# Patient Record
Sex: FEMALE | Race: BLACK OR AFRICAN AMERICAN | NOT HISPANIC OR LATINO | Employment: UNEMPLOYED | URBAN - METROPOLITAN AREA
[De-identification: names, ages, dates, MRNs, and addresses within clinical notes are randomized per-mention and may not be internally consistent; named-entity substitution may affect disease eponyms.]

---

## 2023-10-30 ENCOUNTER — HOSPITAL ENCOUNTER (EMERGENCY)
Facility: HOSPITAL | Age: 23
Discharge: HOME | End: 2023-10-30

## 2023-10-30 VITALS
TEMPERATURE: 98.3 F | RESPIRATION RATE: 18 BRPM | WEIGHT: 187 LBS | OXYGEN SATURATION: 98 % | HEIGHT: 64 IN | BODY MASS INDEX: 31.92 KG/M2 | DIASTOLIC BLOOD PRESSURE: 64 MMHG | SYSTOLIC BLOOD PRESSURE: 126 MMHG | HEART RATE: 88 BPM

## 2023-10-30 DIAGNOSIS — J02.9 ACUTE PHARYNGITIS, UNSPECIFIED ETIOLOGY: Primary | ICD-10-CM

## 2023-10-30 PROCEDURE — 99284 EMERGENCY DEPT VISIT MOD MDM: CPT | Performed by: NURSE PRACTITIONER

## 2023-10-30 PROCEDURE — 2500000004 HC RX 250 GENERAL PHARMACY W/ HCPCS (ALT 636 FOR OP/ED): Performed by: NURSE PRACTITIONER

## 2023-10-30 PROCEDURE — 99283 EMERGENCY DEPT VISIT LOW MDM: CPT

## 2023-10-30 RX ORDER — ACETAMINOPHEN 325 MG/1
650 TABLET ORAL ONCE
Status: COMPLETED | OUTPATIENT
Start: 2023-10-30 | End: 2023-10-30

## 2023-10-30 RX ORDER — AMOXICILLIN 500 MG/1
500 CAPSULE ORAL EVERY 12 HOURS
Qty: 20 CAPSULE | Refills: 0 | Status: SHIPPED | OUTPATIENT
Start: 2023-10-30 | End: 2023-11-09

## 2023-10-30 RX ORDER — IBUPROFEN 600 MG/1
600 TABLET ORAL EVERY 6 HOURS PRN
Qty: 15 TABLET | Refills: 0 | Status: SHIPPED | OUTPATIENT
Start: 2023-10-30 | End: 2023-11-06

## 2023-10-30 RX ADMIN — ACETAMINOPHEN 650 MG: 325 TABLET ORAL at 21:24

## 2023-10-30 RX ADMIN — DEXAMETHASONE 8 MG: 6 TABLET ORAL at 21:25

## 2023-10-30 ASSESSMENT — COLUMBIA-SUICIDE SEVERITY RATING SCALE - C-SSRS
1. IN THE PAST MONTH, HAVE YOU WISHED YOU WERE DEAD OR WISHED YOU COULD GO TO SLEEP AND NOT WAKE UP?: NO
6. HAVE YOU EVER DONE ANYTHING, STARTED TO DO ANYTHING, OR PREPARED TO DO ANYTHING TO END YOUR LIFE?: NO
2. HAVE YOU ACTUALLY HAD ANY THOUGHTS OF KILLING YOURSELF?: NO

## 2023-10-30 ASSESSMENT — PAIN - FUNCTIONAL ASSESSMENT: PAIN_FUNCTIONAL_ASSESSMENT: 0-10

## 2023-10-30 ASSESSMENT — PAIN SCALES - GENERAL
PAINLEVEL_OUTOF10: 6
PAINLEVEL_OUTOF10: 5 - MODERATE PAIN

## 2023-10-30 ASSESSMENT — PAIN SCALES - PAIN ASSESSMENT IN ADVANCED DEMENTIA (PAINAD)
BODYLANGUAGE: RELAXED
BREATHING: NORMAL

## 2023-10-30 ASSESSMENT — ENCOUNTER SYMPTOMS: SORE THROAT: 1

## 2023-10-31 NOTE — ED PROVIDER NOTES
"Emergency Department Encounter  [facility] EMERGENCY MEDICINE    Patient: Shruti Barrientos  MRN: [id number]  : 2000  Date of Evaluation: 10/30/2023  ED Provider: PUNEET James      Chief Complaint       Chief Complaint   Patient presents with    Sore Throat    Mouth Lesions        Limitations to History: none  Historian: patient  Records reviewed: EMR inpatient and outpatient notes, Care Everywhere    This is a 23-year-old female who presents to the emergency room stating \"my throat is on fire.\"  Patient states she developed symptoms today.  Patient has a productive cough with green sputum, sore throat and chills. Patient reports she developed midsternal chest pain today. Denies any known sick contacts.  Patient describes the throat pain as a burning, constant pain rating it a 6 out of 10.  Denies taking any pain medications prior to arrival.  Patient states that she developed a herpes outbreak on her mouth.  Patient states that she used over-the-counter Abreva and Mederma as well as new make-up.  She reports that the skin came off where her herpes blisters were.  Patient endorses chest pain with the cough.    PMH: HSV, tonsil stones  PSH: Denies  Allergies: Latex  Social HX: Denies smoking, occasional alcohol use, denies any drug use.  Family HX: No family history pertinent to current presenting problem  Medications: Denies        ROS:     Review of Systems   HENT:  Positive for sore throat.    Cardiovascular:  Positive for chest pain.   Skin:  Positive for rash.     14 systems reviewed and otherwise acutely negative except as in the [place].    Physical Exam:    Appearance: Alert, oriented , cooperative,  in no acute distress. Well nourished & well hydrated.    Skin: Scaling to the upper lip without any surrounding erythema or drainage.    Eyes: PERRLA, EOMs intact,  Conjunctiva pink with no redness or exudates. Cornea & anterior chamber are clear, Eyelids without lesions.     ENT: " Hearing grossly intact. External auditory canals patent, tympanic membranes intact with visible landmarks. Nares patent, mucus membranes moist. Dentition without lesions. Pharynx clear, uvula midline.     Neck: Supple, without meningismus.     Pulmonary: Clear bilaterally with good chest wall excursion. No rales, rhonchi or wheezing. No accessory muscle use or stridor.    Cardiac: Normal S1, S2 without murmur, rub, gallop or extrasystole. No JVD, Carotids without bruits.    Abdomen: Soft, nontender, active bowel sounds.  No palpable organomegaly.  No rebound or guarding.        Musculoskeletal: Full range of motion. no pain, edema, or deformity. Pulses full and equal. No cyanosis, clubbing, or edema.    Neurological:  Cranial nerves II through XII are grossly intact, finger-nose touch is normal, normal sensation, no weakness, no focal findings identified.    Psychiatric: Appropriate mood and affect.       Past History     Past Medical History:   Diagnosis Date    Acute vaginitis 05/20/2021    Bacterial vaginosis    Elevated blood-pressure reading, without diagnosis of hypertension 02/09/2021    Elevated blood pressure reading without diagnosis of hypertension    Encounter for gynecological examination (general) (routine) without abnormal findings 05/11/2018    Well woman exam with routine gynecological exam    Encounter for immunization 03/30/2016    Immunization due    Encounter for initial prescription of contraceptive pills 05/31/2017    Encounter for initial prescription of contraceptive pills    Encounter for other general counseling and advice on contraception 02/10/2017    Encounter for counseling regarding contraception    Encounter for pregnancy test, result negative 07/01/2020    Negative pregnancy test    Encounter for pregnancy test, result positive 07/02/2020    Positive urine pregnancy test    Encounter for screening for infections with a predominantly sexual mode of transmission 07/01/2020    Routine  screening for STI (sexually transmitted infection)    Encounter for screening for respiratory tuberculosis 12/20/2019    Encounter for PPD test    Encounter for supervision of normal first pregnancy, unspecified trimester     Encounter for prenatal care of first pregnancy    Encounter for surveillance of contraceptive pills 05/11/2018    Encounter for surveillance of contraceptive pills    Irregular menstruation, unspecified 05/11/2018    Missed period    Irregular menstruation, unspecified 07/02/2020    Missed menses    Laceration without foreign body of vagina and vulva, initial encounter 06/29/2021    Labial tear    Mastodynia 01/15/2020    Breast tenderness in female    Other problems related to lifestyle 07/01/2020    Other problems related to lifestyle    Other specified disorders of nose and nasal sinuses 05/01/2019    Sinus pressure    Other specified health status     No pertinent past surgical history    Pelvic and perineal pain 01/15/2020    Pelvic cramping    Personal history of other complications of pregnancy, childbirth and the puerperium 02/09/2021    History of pre-eclampsia    Personal history of other diseases of the female genital tract 01/15/2020    History of irregular menstrual cycles    Personal history of other diseases of the female genital tract 05/08/2019    History of ovarian cyst    Personal history of other diseases of the female genital tract 07/30/2018    History of acute pelvic inflammatory disease    Personal history of other diseases of the respiratory system 05/19/2016    History of sinusitis    Personal history of other diseases of the respiratory system 05/19/2016    History of allergic rhinitis    Personal history of other specified conditions 07/30/2018    History of abdominal pain    Unspecified asthma, uncomplicated 11/22/2017    Asthma     Past Surgical History:   Procedure Laterality Date    OTHER SURGICAL HISTORY  03/03/2021    No history of surgery  "        Medications/Allergies     Previous Medications    No medications on file     Allergies   Allergen Reactions    Latex Hives        Physical Exam       ED Triage Vitals [10/30/23 2008]   Temp Heart Rate Resp BP   36.8 °C (98.3 °F) 88 16 (!) 146/96      SpO2 Temp Source Heart Rate Source Patient Position   98 % Temporal -- --      BP Location FiO2 (%)     -- --         Diagnostics   Labs:  No results found for this or any previous visit (from the past 24 hour(s)).   Radiographs:  No orders to display     EKG:  NSR HR 77  10/30/23 at 2148  Indication: CP        Assessment   In brief, Shruti Barrientos is a 23 y.o. female who presented to the emergency department with a sore throat.          ED Course   Visit Vitals  BP (!) 146/96   Pulse 88   Temp 36.8 °C (98.3 °F) (Temporal)   Resp 16   Ht 1.626 m (5' 4\")   Wt 84.8 kg (187 lb)   SpO2 98%   BMI 32.10 kg/m²   BSA 1.96 m²       Medications   dexAMETHasone (Decadron) tablet 8 mg (8 mg oral Given 10/30/23 2125)   acetaminophen (Tylenol) tablet 650 mg (650 mg oral Given 10/30/23 2124)       Patient remained stable while in the emergency department. Previous outpatient and ED records were reviewed. Outside records were reviewed.  Strep swab was obtained, pending results. Patient received Dexamethasone 8 mg PO once and Tylenol 650 mg PO once.  Patient was reporting chest pain and EKG was obtained which shows normal sinus rhythm at 77.  Chest pain likely due to the cough.  Vital signs were stable.  Patient was discharged home with a prescription for amoxicillin for acute pharyngitis and Tylenol as needed for pain.  Patient was advised to return the emergency room with worsening symptoms and follow-up with her primary care doctor.  Final Impression      1. Acute pharyngitis, unspecified etiology          DISPOSITION  Disposition: Discharged home    Comment: Please note this report has been produced using speech recognition software and may contain errors related to that " system including errors in grammar, punctuation, and spelling, as well as words and phrases that may be inappropriate.  If there are any questions or concerns please feel free to contact the dictating provider for clarification.    SANDRA James-PUNEET Gaines  10/30/23 4052

## 2023-10-31 NOTE — ED TRIAGE NOTES
Pt coming to ED with herpes virus outbreak in mouth starting Friday, small lesions on upper lip. Pt c/o sore throat starting today, denies any other complaints at this time.

## 2023-11-08 ENCOUNTER — HOSPITAL ENCOUNTER (EMERGENCY)
Facility: HOSPITAL | Age: 23
Discharge: HOME | End: 2023-11-08

## 2023-11-08 ENCOUNTER — APPOINTMENT (OUTPATIENT)
Dept: RADIOLOGY | Facility: HOSPITAL | Age: 23
End: 2023-11-08

## 2023-11-08 VITALS
OXYGEN SATURATION: 99 % | RESPIRATION RATE: 16 BRPM | DIASTOLIC BLOOD PRESSURE: 83 MMHG | TEMPERATURE: 97.9 F | SYSTOLIC BLOOD PRESSURE: 138 MMHG | HEART RATE: 82 BPM

## 2023-11-08 DIAGNOSIS — J06.9 VIRAL URI WITH COUGH: Primary | ICD-10-CM

## 2023-11-08 LAB
FLUAV RNA RESP QL NAA+PROBE: NOT DETECTED
FLUBV RNA RESP QL NAA+PROBE: NOT DETECTED
SARS-COV-2 RNA RESP QL NAA+PROBE: NOT DETECTED

## 2023-11-08 PROCEDURE — 99284 EMERGENCY DEPT VISIT MOD MDM: CPT | Performed by: NURSE PRACTITIONER

## 2023-11-08 PROCEDURE — 2500000001 HC RX 250 WO HCPCS SELF ADMINISTERED DRUGS (ALT 637 FOR MEDICARE OP): Mod: SE | Performed by: NURSE PRACTITIONER

## 2023-11-08 PROCEDURE — 99283 EMERGENCY DEPT VISIT LOW MDM: CPT | Mod: 25

## 2023-11-08 PROCEDURE — 71046 X-RAY EXAM CHEST 2 VIEWS: CPT | Performed by: RADIOLOGY

## 2023-11-08 PROCEDURE — 71046 X-RAY EXAM CHEST 2 VIEWS: CPT | Mod: FY

## 2023-11-08 PROCEDURE — 87636 SARSCOV2 & INF A&B AMP PRB: CPT | Performed by: EMERGENCY MEDICINE

## 2023-11-08 PROCEDURE — 99285 EMERGENCY DEPT VISIT HI MDM: CPT | Mod: 25

## 2023-11-08 RX ORDER — ALBUTEROL SULFATE 90 UG/1
2 AEROSOL, METERED RESPIRATORY (INHALATION) EVERY 4 HOURS PRN
Qty: 18 G | Refills: 0 | Status: SHIPPED | OUTPATIENT
Start: 2023-11-08 | End: 2023-12-08

## 2023-11-08 RX ORDER — PREDNISONE 20 MG/1
40 TABLET ORAL DAILY
Qty: 10 TABLET | Refills: 0 | Status: SHIPPED | OUTPATIENT
Start: 2023-11-08 | End: 2023-11-13

## 2023-11-08 RX ORDER — IBUPROFEN 600 MG/1
600 TABLET ORAL ONCE
Status: COMPLETED | OUTPATIENT
Start: 2023-11-08 | End: 2023-11-08

## 2023-11-08 RX ORDER — GUAIFENESIN 100 MG/5ML
200 SOLUTION ORAL ONCE
Status: COMPLETED | OUTPATIENT
Start: 2023-11-08 | End: 2023-11-08

## 2023-11-08 RX ORDER — BENZONATATE 100 MG/1
100 CAPSULE ORAL 3 TIMES DAILY PRN
Qty: 21 CAPSULE | Refills: 0 | Status: SHIPPED | OUTPATIENT
Start: 2023-11-08 | End: 2023-11-15

## 2023-11-08 RX ADMIN — GUAIFENESIN 200 MG: 100 SOLUTION ORAL at 12:15

## 2023-11-08 RX ADMIN — IBUPROFEN 600 MG: 600 TABLET ORAL at 12:15

## 2023-11-08 ASSESSMENT — COLUMBIA-SUICIDE SEVERITY RATING SCALE - C-SSRS
1. IN THE PAST MONTH, HAVE YOU WISHED YOU WERE DEAD OR WISHED YOU COULD GO TO SLEEP AND NOT WAKE UP?: NO
2. HAVE YOU ACTUALLY HAD ANY THOUGHTS OF KILLING YOURSELF?: NO
6. HAVE YOU EVER DONE ANYTHING, STARTED TO DO ANYTHING, OR PREPARED TO DO ANYTHING TO END YOUR LIFE?: NO

## 2023-11-08 ASSESSMENT — ENCOUNTER SYMPTOMS
WHEEZING: 1
SHORTNESS OF BREATH: 0
VOICE CHANGE: 0
NAUSEA: 0
COUGH: 1
TROUBLE SWALLOWING: 0
VOMITING: 0
FEVER: 0
DIARRHEA: 0
RHINORRHEA: 0
CHILLS: 0
SORE THROAT: 0

## 2023-11-08 NOTE — ED PROVIDER NOTES
HPI   Chief Complaint   Patient presents with    Sore Throat       HPI 23 year old female with hx of asthma presents to the Emergency Department today with complaints of productive cough x 1 week. Was evaluated in the ED approx 1 week ago for sore throat, reports having negative strep testing and was given Amox for pharyngitis. She reports that her sore throat has improved, however the cough has persisted. She does endorse occasional wheezing and chest tightness with cough. Does not have a home inhaler. Denies fever/chills, congestion, rhinorrhea, dyspnea, n/v, hoarseness. Tolerating PO without difficulty. Denies any known COVID exposure.      Review of Systems   Constitutional:  Negative for chills and fever.   HENT:  Negative for congestion, ear pain, rhinorrhea, sore throat, trouble swallowing and voice change.    Respiratory:  Positive for cough and wheezing. Negative for shortness of breath.    Cardiovascular:  Positive for chest pain.   Gastrointestinal:  Negative for diarrhea, nausea and vomiting.                    No data recorded                Patient History   Past Medical History:   Diagnosis Date    Acute vaginitis 05/20/2021    Bacterial vaginosis    Elevated blood-pressure reading, without diagnosis of hypertension 02/09/2021    Elevated blood pressure reading without diagnosis of hypertension    Encounter for gynecological examination (general) (routine) without abnormal findings 05/11/2018    Well woman exam with routine gynecological exam    Encounter for immunization 03/30/2016    Immunization due    Encounter for initial prescription of contraceptive pills 05/31/2017    Encounter for initial prescription of contraceptive pills    Encounter for other general counseling and advice on contraception 02/10/2017    Encounter for counseling regarding contraception    Encounter for pregnancy test, result negative 07/01/2020    Negative pregnancy test    Encounter for pregnancy test, result positive  07/02/2020    Positive urine pregnancy test    Encounter for screening for infections with a predominantly sexual mode of transmission 07/01/2020    Routine screening for STI (sexually transmitted infection)    Encounter for screening for respiratory tuberculosis 12/20/2019    Encounter for PPD test    Encounter for supervision of normal first pregnancy, unspecified trimester     Encounter for prenatal care of first pregnancy    Encounter for surveillance of contraceptive pills 05/11/2018    Encounter for surveillance of contraceptive pills    Irregular menstruation, unspecified 05/11/2018    Missed period    Irregular menstruation, unspecified 07/02/2020    Missed menses    Laceration without foreign body of vagina and vulva, initial encounter 06/29/2021    Labial tear    Mastodynia 01/15/2020    Breast tenderness in female    Other problems related to lifestyle 07/01/2020    Other problems related to lifestyle    Other specified disorders of nose and nasal sinuses 05/01/2019    Sinus pressure    Other specified health status     No pertinent past surgical history    Pelvic and perineal pain 01/15/2020    Pelvic cramping    Personal history of other complications of pregnancy, childbirth and the puerperium 02/09/2021    History of pre-eclampsia    Personal history of other diseases of the female genital tract 01/15/2020    History of irregular menstrual cycles    Personal history of other diseases of the female genital tract 05/08/2019    History of ovarian cyst    Personal history of other diseases of the female genital tract 07/30/2018    History of acute pelvic inflammatory disease    Personal history of other diseases of the respiratory system 05/19/2016    History of sinusitis    Personal history of other diseases of the respiratory system 05/19/2016    History of allergic rhinitis    Personal history of other specified conditions 07/30/2018    History of abdominal pain    Unspecified asthma, uncomplicated  11/22/2017    Asthma     Past Surgical History:   Procedure Laterality Date    OTHER SURGICAL HISTORY  03/03/2021    No history of surgery     No family history on file.  Social History     Tobacco Use    Smoking status: Not on file    Smokeless tobacco: Not on file   Substance Use Topics    Alcohol use: Not on file    Drug use: Not on file       Physical Exam   ED Triage Vitals [11/08/23 1124]   Temp Heart Rate Resp BP   36.6 °C (97.9 °F) 82 16 138/83      SpO2 Temp src Heart Rate Source Patient Position   99 % -- -- --      BP Location FiO2 (%)     -- --       Physical Exam  Vitals reviewed.   Constitutional:       Appearance: She is well-developed.   HENT:      Head: Normocephalic and atraumatic.      Right Ear: Tympanic membrane and ear canal normal.      Left Ear: Tympanic membrane and ear canal normal.      Mouth/Throat:      Mouth: Mucous membranes are moist. No oral lesions.      Pharynx: Uvula midline. No pharyngeal swelling, oropharyngeal exudate or posterior oropharyngeal erythema.      Tonsils: No tonsillar exudate. 1+ on the right. 1+ on the left.   Cardiovascular:      Rate and Rhythm: Normal rate and regular rhythm.   Pulmonary:      Effort: Pulmonary effort is normal. No respiratory distress.      Breath sounds: Normal breath sounds. No wheezing, rhonchi or rales.   Musculoskeletal:      Cervical back: Neck supple.   Lymphadenopathy:      Cervical: No cervical adenopathy.   Skin:     General: Skin is warm and dry.   Neurological:      Mental Status: She is alert.     XR chest 2 views    Result Date: 11/8/2023  Interpreted By:  Quoc Valle, STUDY: Chest dated  11/8/2023.   INDICATION: Signs/Symptoms:cough   COMPARISON: Chest dated 03/18/2015.   ACCESSION NUMBER(S): [id number]   ORDERING CLINICIAN: HORACIO ALVAREZ   TECHNIQUE: Two views of the chest.   FINDINGS: The lungs are clear.  No pneumothorax or effusion is evident. The cardiomediastinal silhouette is  not enlarged.The soft tissues are  grossly unremarkable.       No acute cardiopulmonary process is evident.   MACRO: None   Signed by: Quoc Valle 11/8/2023 1:11 PM Dictation workstation:   [id number]      ED Course & MDM   Diagnoses as of 11/08/23 1355   Viral URI with cough   23 year old female with hx of asthma presents to the ED with complaints of productive cough x 1 week. She does report associated chest tightness with cough and occasional wheezing. Denies fevers, congestion, rhinorrhea. Was evaluated in the ED last week, given Amoxicillin for pharyngitis with improvement of sore throat. (Negative strep at that time). She is well appearing, resting comfortably without distress. COVID and Influenza testing obtained and unremarkable. CXR did not reveal any acute findings. Results discussed with patient. She is stable for discharge home with rx for Prednisone 40mg daily x 5 days, Proair inhaler PRN for SOB/wheezing and Tessalon Perles TID PRN for cough. Suspect likely viral URI/bronchitis. Advised to follow up with PCP within the next week as needed and return to the ED for any new or worsening symptoms. She did verbalize understanding and remains in stable condition at the time of discharge.    Medical Decision Making      Procedure  Procedures     Damaris Mitchell, SANDRA-CNP  11/08/23 [id number]

## 2024-01-19 ENCOUNTER — HOSPITAL ENCOUNTER (EMERGENCY)
Facility: HOSPITAL | Age: 24
Discharge: HOME | End: 2024-01-19

## 2024-01-19 VITALS
SYSTOLIC BLOOD PRESSURE: 128 MMHG | HEART RATE: 105 BPM | RESPIRATION RATE: 18 BRPM | OXYGEN SATURATION: 96 % | BODY MASS INDEX: 34.15 KG/M2 | DIASTOLIC BLOOD PRESSURE: 83 MMHG | TEMPERATURE: 98.4 F | WEIGHT: 200 LBS | HEIGHT: 64 IN

## 2024-01-19 DIAGNOSIS — U07.1 COVID: Primary | ICD-10-CM

## 2024-01-19 LAB
FLUAV RNA RESP QL NAA+PROBE: NOT DETECTED
FLUBV RNA RESP QL NAA+PROBE: NOT DETECTED
S PYO DNA THROAT QL NAA+PROBE: NOT DETECTED
SARS-COV-2 RNA RESP QL NAA+PROBE: DETECTED

## 2024-01-19 PROCEDURE — 87651 STREP A DNA AMP PROBE: CPT | Performed by: PHYSICIAN ASSISTANT

## 2024-01-19 PROCEDURE — 87636 SARSCOV2 & INF A&B AMP PRB: CPT | Performed by: PHYSICIAN ASSISTANT

## 2024-01-19 PROCEDURE — 99284 EMERGENCY DEPT VISIT MOD MDM: CPT | Performed by: PHYSICIAN ASSISTANT

## 2024-01-19 PROCEDURE — 99283 EMERGENCY DEPT VISIT LOW MDM: CPT

## 2024-01-19 RX ORDER — ACETAMINOPHEN 325 MG/1
975 TABLET ORAL ONCE
Status: COMPLETED | OUTPATIENT
Start: 2024-01-19 | End: 2024-01-19

## 2024-01-19 RX ORDER — IBUPROFEN 600 MG/1
600 TABLET ORAL EVERY 6 HOURS PRN
Qty: 20 TABLET | Refills: 0 | Status: SHIPPED | OUTPATIENT
Start: 2024-01-19 | End: 2024-01-24

## 2024-01-19 RX ORDER — BENZOCAINE AND MENTHOL 15; 3.6 MG/1; MG/1
1 LOZENGE ORAL EVERY 2 HOUR PRN
Qty: 50 LOZENGE | Refills: 0 | Status: SHIPPED | OUTPATIENT
Start: 2024-01-19 | End: 2024-01-26

## 2024-01-19 RX ORDER — ACETAMINOPHEN 325 MG/1
650 TABLET ORAL EVERY 6 HOURS PRN
Qty: 20 TABLET | Refills: 0 | Status: SHIPPED | OUTPATIENT
Start: 2024-01-19 | End: 2024-01-24

## 2024-01-19 RX ADMIN — ACETAMINOPHEN 975 MG: 325 TABLET ORAL at 21:25

## 2024-01-19 ASSESSMENT — LIFESTYLE VARIABLES
HAVE YOU EVER FELT YOU SHOULD CUT DOWN ON YOUR DRINKING: NO
EVER FELT BAD OR GUILTY ABOUT YOUR DRINKING: NO
HAVE PEOPLE ANNOYED YOU BY CRITICIZING YOUR DRINKING: NO
EVER HAD A DRINK FIRST THING IN THE MORNING TO STEADY YOUR NERVES TO GET RID OF A HANGOVER: NO
REASON UNABLE TO ASSESS: NO

## 2024-01-19 NOTE — Clinical Note
Shruti Barrientos was seen and treated in our emergency department on 1/19/2024.  She may return to work on 01/23/2024.       If you have any questions or concerns, please don't hesitate to call.      Courtney Werner PA-C

## 2024-01-20 NOTE — ED PROVIDER NOTES
"This is a 23-year-old female who presents to the ED with a sore throat, headache, and left ear pain that have been occurring for the past 2 days.  She denies any nasal congestion, rhinorrhea, fevers, cough, chest pain or shortness of breath.  She endorses objective chills at home.  She also endorses possible concern for pregnancy.  she states that she has been having tenderness that is worse on the left side.  She denies any swelling, redness, or abscesses to the breast.  She denies any abdominal pain.  She took Advil at home with some relief of her symptoms.  She states otherwise she has been in her normal state of health.      History provided by:  Patient   used: No             Visit Vitals  /83   Pulse (!) 105   Temp 36.9 °C (98.4 °F)   Resp 18   Ht 1.626 m (5' 4\")   Wt 90.7 kg (200 lb)   SpO2 96%   BMI 34.33 kg/m²   OB Status Unknown   BSA 2.02 m²          Physical Exam     Physical exam:   Gen.: Vitals noted no distress afebrile. Normal phonation, no stridor, no trismus.   Eyes: PERRL. EOMI. no scleral icterus. Eye lids without lesions.   ENT: Posterior oropharynx mildly erythematous without exudates or edema.  No visible peritonsillar abscess.. Uvula is in the midline and nonedematous. No Martinez's Angina.  Right TM clear, left TM with visualized serous effusion without any erythema or bulging EACs unremarkable. Hearing grossly intact. Mastoids nontender.   Neck: Supple. No meningismus through full range of motion. No lymphadenopathy.   Cardiac: Regular rate rhythm. No murmurs, gallops, rubs  Lungs: Good aeration throughout. No adventitious breath sounds. No wheezes, rhonchi, rales  Extremities: No peripheral edema. Full range of motion. Moves all extremities freely.   Skin: No rash. Warm and dry  Neuro: No focal neurologic deficits. CN 2-12 grossly intact          Labs Reviewed   GROUP A STREPTOCOCCUS, PCR   SARS-COV-2 AND INFLUENZA A/B PCR   POCT PREGNANCY, URINE       No orders to " display         ED Course & MDM     Medical Decision Making  This is a 23-year-old female with no significant past medical history presents to the ED with a sore throat as well as ear pain and a headache.  Vitals show she was mildly tachycardic 105 bpm, vitals otherwise grossly unremarkable.  On examination posterior oropharynx mildly erythematous without exudates or edema.  Uvula midline.  No visualized peritonsillar abscess.  Normal phonation she is tolerating her own secretions on her description of her symptoms I have low suspicion for retropharyngeal abscess.  Right TM and EAC clear.  Left EAC clear, left TM had a visualized effusion present, however no bulging or erythema.  Strep, COVID, and flu swabs obtained.  Patient medicated with Tylenol for her symptoms.  Strep test negative.  Flu negative.  COVID-positive.  Patient informed of these results.  She was given signs and symptoms of severe infection that she should return to the ED with.  She was advised on COVID quarantine protocols.  She was given a note for her job as well as prescriptions for Motrin, Tylenol, and Cepacol lozenges.  She was agreeable to this plan and had no further questions at time of discharge.    Amount and/or Complexity of Data Reviewed  Labs: ordered.    Risk  Prescription drug management.         ED Course as of 01/19/24 2243 Fri Jan 19, 2024 2236 Coronavirus 2019, PCR(!): Detected  COVID test positive [AW]      ED Course User Index  [AW] Courtney Werner PA-C         Diagnoses as of 01/19/24 2243   COVID       Procedures    SAMMY Okeefe PA-C Abigail E Wilch, PA-C  01/19/24 2244

## 2024-01-20 NOTE — ED TRIAGE NOTES
Pt to ED c/o fu like symptoms that started yesterday. Pt endorsing sore throat and left ear pain and headaches. Pt tried erasto -seltzer cold for relief at home Pt denies any PMH.

## 2025-06-12 ENCOUNTER — HOSPITAL ENCOUNTER (EMERGENCY)
Facility: HOSPITAL | Age: 25
Discharge: HOME | End: 2025-06-12

## 2025-06-12 ENCOUNTER — APPOINTMENT (OUTPATIENT)
Dept: RADIOLOGY | Facility: HOSPITAL | Age: 25
End: 2025-06-12

## 2025-06-12 ENCOUNTER — APPOINTMENT (OUTPATIENT)
Dept: CARDIOLOGY | Facility: HOSPITAL | Age: 25
End: 2025-06-12

## 2025-06-12 VITALS
SYSTOLIC BLOOD PRESSURE: 153 MMHG | HEIGHT: 64 IN | OXYGEN SATURATION: 99 % | DIASTOLIC BLOOD PRESSURE: 80 MMHG | BODY MASS INDEX: 34.15 KG/M2 | RESPIRATION RATE: 18 BRPM | WEIGHT: 200 LBS | HEART RATE: 80 BPM | TEMPERATURE: 98.1 F

## 2025-06-12 DIAGNOSIS — R07.9 CHEST PAIN, UNSPECIFIED TYPE: Primary | ICD-10-CM

## 2025-06-12 DIAGNOSIS — J06.9 UPPER RESPIRATORY TRACT INFECTION, UNSPECIFIED TYPE: ICD-10-CM

## 2025-06-12 LAB
ALBUMIN SERPL BCP-MCNC: 4.1 G/DL (ref 3.4–5)
ALP SERPL-CCNC: 58 U/L (ref 33–110)
ALT SERPL W P-5'-P-CCNC: 11 U/L (ref 7–45)
ANION GAP SERPL CALC-SCNC: 11 MMOL/L (ref 10–20)
AST SERPL W P-5'-P-CCNC: 14 U/L (ref 9–39)
B-HCG SERPL-ACNC: <2 MIU/ML
BASOPHILS # BLD AUTO: 0.03 X10*3/UL (ref 0–0.1)
BASOPHILS NFR BLD AUTO: 0.4 %
BILIRUB SERPL-MCNC: 0.4 MG/DL (ref 0–1.2)
BUN SERPL-MCNC: 6 MG/DL (ref 6–23)
CALCIUM SERPL-MCNC: 9.2 MG/DL (ref 8.6–10.3)
CARDIAC TROPONIN I PNL SERPL HS: <3 NG/L (ref 0–13)
CHLORIDE SERPL-SCNC: 106 MMOL/L (ref 98–107)
CO2 SERPL-SCNC: 25 MMOL/L (ref 21–32)
CREAT SERPL-MCNC: 0.68 MG/DL (ref 0.5–1.05)
D DIMER PPP FEU-MCNC: 318 NG/ML FEU
EGFRCR SERPLBLD CKD-EPI 2021: >90 ML/MIN/1.73M*2
EOSINOPHIL # BLD AUTO: 0.1 X10*3/UL (ref 0–0.7)
EOSINOPHIL NFR BLD AUTO: 1.3 %
ERYTHROCYTE [DISTWIDTH] IN BLOOD BY AUTOMATED COUNT: 13.1 % (ref 11.5–14.5)
GLUCOSE SERPL-MCNC: 89 MG/DL (ref 74–99)
HCT VFR BLD AUTO: 39.9 % (ref 36–46)
HGB BLD-MCNC: 12.6 G/DL (ref 12–16)
IMM GRANULOCYTES # BLD AUTO: 0 X10*3/UL (ref 0–0.7)
IMM GRANULOCYTES NFR BLD AUTO: 0 % (ref 0–0.9)
LYMPHOCYTES # BLD AUTO: 2.26 X10*3/UL (ref 1.2–4.8)
LYMPHOCYTES NFR BLD AUTO: 29.8 %
MCH RBC QN AUTO: 27.7 PG (ref 26–34)
MCHC RBC AUTO-ENTMCNC: 31.6 G/DL (ref 32–36)
MCV RBC AUTO: 88 FL (ref 80–100)
MONOCYTES # BLD AUTO: 0.42 X10*3/UL (ref 0.1–1)
MONOCYTES NFR BLD AUTO: 5.5 %
NEUTROPHILS # BLD AUTO: 4.77 X10*3/UL (ref 1.2–7.7)
NEUTROPHILS NFR BLD AUTO: 63 %
NRBC BLD-RTO: 0 /100 WBCS (ref 0–0)
PLATELET # BLD AUTO: 229 X10*3/UL (ref 150–450)
POTASSIUM SERPL-SCNC: 3.7 MMOL/L (ref 3.5–5.3)
PROT SERPL-MCNC: 7 G/DL (ref 6.4–8.2)
RBC # BLD AUTO: 4.55 X10*6/UL (ref 4–5.2)
SODIUM SERPL-SCNC: 138 MMOL/L (ref 136–145)
WBC # BLD AUTO: 7.6 X10*3/UL (ref 4.4–11.3)

## 2025-06-12 PROCEDURE — 93005 ELECTROCARDIOGRAM TRACING: CPT

## 2025-06-12 PROCEDURE — 85025 COMPLETE CBC W/AUTO DIFF WBC: CPT | Performed by: PHYSICIAN ASSISTANT

## 2025-06-12 PROCEDURE — 99285 EMERGENCY DEPT VISIT HI MDM: CPT | Mod: 25

## 2025-06-12 PROCEDURE — 71046 X-RAY EXAM CHEST 2 VIEWS: CPT

## 2025-06-12 PROCEDURE — 85379 FIBRIN DEGRADATION QUANT: CPT | Performed by: PHYSICIAN ASSISTANT

## 2025-06-12 PROCEDURE — 36415 COLL VENOUS BLD VENIPUNCTURE: CPT | Performed by: PHYSICIAN ASSISTANT

## 2025-06-12 PROCEDURE — 84702 CHORIONIC GONADOTROPIN TEST: CPT | Performed by: PHYSICIAN ASSISTANT

## 2025-06-12 PROCEDURE — 80053 COMPREHEN METABOLIC PANEL: CPT | Performed by: PHYSICIAN ASSISTANT

## 2025-06-12 PROCEDURE — 84484 ASSAY OF TROPONIN QUANT: CPT | Performed by: PHYSICIAN ASSISTANT

## 2025-06-12 PROCEDURE — 71046 X-RAY EXAM CHEST 2 VIEWS: CPT | Performed by: RADIOLOGY

## 2025-06-12 ASSESSMENT — PAIN - FUNCTIONAL ASSESSMENT: PAIN_FUNCTIONAL_ASSESSMENT: 0-10

## 2025-06-12 ASSESSMENT — PAIN DESCRIPTION - PAIN TYPE: TYPE: ACUTE PAIN

## 2025-06-12 ASSESSMENT — PAIN DESCRIPTION - LOCATION: LOCATION: CHEST

## 2025-06-12 ASSESSMENT — PAIN DESCRIPTION - ORIENTATION: ORIENTATION: RIGHT;MID

## 2025-06-12 ASSESSMENT — PAIN SCALES - GENERAL: PAINLEVEL_OUTOF10: 6

## 2025-06-12 NOTE — Clinical Note
Shruti Barrientos was seen and treated in our emergency department on 6/12/2025.  She may return to work on 06/16/2025.       If you have any questions or concerns, please don't hesitate to call.      Jani Cao PA-C

## 2025-06-12 NOTE — ED TRIAGE NOTES
Pt coming in for chest pain. States that it started Monday. States that the pain is on the right side of her sternum. Also complaining of having a sore throat and feels like there is something in the back of her throat. States that she feels like she has to cough out phlegm from her chest but there isnt anything for her to cough out.

## 2025-06-13 NOTE — ED PROVIDER NOTES
HPI   Chief Complaint   Patient presents with    Chest Pain       Otherwise healthy 24-year-old female presents today complaining of chest  tightness. States that it started Monday. States that the pain is on the right side of her sternum. Also complaining of having a sore throat and feels like there is something in the back of her throat. States that she feels like she has to cough out phlegm from her chest but there isnt anything for her to cough out.               Patient History   Medical History[1]  Surgical History[2]  Family History[3]  Social History[4]    Physical Exam   ED Triage Vitals [06/12/25 1725]   Temperature Heart Rate Respirations BP   36.7 °C (98.1 °F) 88 16 139/81      Pulse Ox Temp Source Heart Rate Source Patient Position   98 % Tympanic Monitor Sitting      BP Location FiO2 (%)     Right arm --       Physical Exam  Vitals and nursing note reviewed.   Constitutional:       General: She is not in acute distress.     Appearance: Normal appearance. She is normal weight. She is not ill-appearing, toxic-appearing or diaphoretic.   HENT:      Head: Normocephalic.      Nose: Nose normal.      Mouth/Throat:      Mouth: Mucous membranes are moist.   Eyes:      Extraocular Movements: Extraocular movements intact.      Conjunctiva/sclera: Conjunctivae normal.   Cardiovascular:      Rate and Rhythm: Normal rate and regular rhythm.      Pulses: Normal pulses.   Pulmonary:      Effort: Pulmonary effort is normal. No respiratory distress.      Breath sounds: Normal breath sounds.   Abdominal:      General: Abdomen is flat. There is no distension.      Palpations: Abdomen is soft.      Tenderness: There is no abdominal tenderness. There is no guarding or rebound.   Musculoskeletal:         General: Normal range of motion.      Cervical back: Normal range of motion and neck supple.   Skin:     General: Skin is warm and dry.      Capillary Refill: Capillary refill takes less than 2 seconds.   Neurological:       General: No focal deficit present.      Mental Status: She is alert and oriented to person, place, and time.   Psychiatric:         Mood and Affect: Mood normal.         Behavior: Behavior normal.         Thought Content: Thought content normal.         Judgment: Judgment normal.           ED Course & [facility]   ED Course as of 06/12/25 2057   Thu Jun 12, 2025 2056 D-Dimer, Quantitative VTE Exclusion: 318 [DS]   2056 WBC: 7.6 [DS]   2056 HCG, Beta-Quantitative: <2 [DS]   2056 Troponin I, High Sensitivity: <3 [DS]   2056 Creatinine: 0.68 [DS]   2056 IMPRESSION:  No acute cardiopulmonary process.   [DS]      ED Course User Index  [DS] Jani Cao PA-C                 No data recorded     [place] Coma Scale Score: 15 (06/12/25 1732 : Sean Cardenas RN)                           Medical Decision Making    Medical Decision Making & ED Course  Medical Decision Making:  Given the history of present illness cardiac workup was initiated.  Blood work revealed no evidence of leukocytosis.  D-dimer negative.  hCG negative.  Creatinine within normal limits.  Troponin negative.  Chest x-ray negative.  Patient was notified of the findings.  EKG was obtained and interpreted by myself revealing normal sinus rhythm with sinus arrhythmia at a rate of 88.  No evidence of acute STEMI.  QTc 435.  Patient has been seen and evaluated on 2 separate occasions for the symptoms.  I explained my concern for viral etiology.  Recommended Tylenol as needed for pain along with warm salt water gargles.  No indication for further workup  --  Scoring Tools Utilized: Heart score of 1    Differential diagnoses considered include but are not limited to: ACS, pneumonia, PE     Social Determinants of Health which Significantly Impact Care: None identified The following actions were taken to address these social determinants: None    EKG Independent Interpretation: EKG interpreted by myself. Please see ED Course for full interpretation.    Independent  Result Review and Interpretation: Relevant laboratory and radiographic results were reviewed and independently interpreted by myself.  As necessary, they are commented on in the ED Course.    Chronic conditions affecting the patient's care: None    The patient was discussed with the following consultants/services: None    Care Considerations: Considered ordering CT PE study, but chose not to because the patient has a negative D-dimer    ED Course:  ED Course as of 06/12/25 2110  ------------------------------------------------------------  Time: 06/12 2056  Value: D-Dimer, Quantitative VTE Exclusion: 318  Comment: (Reviewed)  By: Jani Cao PA-C  ------------------------------------------------------------  Time: 06/12 2056  Value: WBC: 7.6  Comment: (Reviewed)  By: Jani Cao PA-C  ------------------------------------------------------------  Time: 06/12 2056  Value: HCG, Beta-Quantitative: <2  Comment: (Reviewed)  By: Jani Cao PA-C  ------------------------------------------------------------  Time: 06/12 2056  Value: Troponin I, High Sensitivity: <3  Comment: (Reviewed)  By: Jani Cao PA-C  ------------------------------------------------------------  Time: 06/12 2056  Value: Creatinine: 0.68  Comment: (Reviewed)  By: Jani Cao PA-C  ------------------------------------------------------------  Time: 06/12 2056  Comment: IMPRESSION:No acute cardiopulmonary process.  By: Jani Cao PA-C     Disposition  As a result of the work-up, the patient was discharged home.  she was informed of her diagnosis and instructed to come back with any concerns or worsening of condition.  she and was agreeable to the plan as discussed above.  she was given the opportunity to ask questions.  All of the patient's questions were answered.      Patient was seen independently    Jani Cao PA-C  Emergency Medicine            Procedure  Procedures       [1]   Past Medical History:  Diagnosis Date    Acute  vaginitis 05/20/2021    Bacterial vaginosis    Elevated blood-pressure reading, without diagnosis of hypertension 02/09/2021    Elevated blood pressure reading without diagnosis of hypertension    Encounter for gynecological examination (general) (routine) without abnormal findings 05/11/2018    Well woman exam with routine gynecological exam    Encounter for immunization 03/30/2016    Immunization due    Encounter for initial prescription of contraceptive pills 05/31/2017    Encounter for initial prescription of contraceptive pills    Encounter for other general counseling and advice on contraception 02/10/2017    Encounter for counseling regarding contraception    Encounter for pregnancy test, result negative 07/01/2020    Negative pregnancy test    Encounter for pregnancy test, result positive ([facility]) 07/02/2020    Positive urine pregnancy test    Encounter for screening for infections with a predominantly sexual mode of transmission 07/01/2020    Routine screening for STI (sexually transmitted infection)    Encounter for screening for respiratory tuberculosis 12/20/2019    Encounter for PPD test    Encounter for supervision of normal first pregnancy, unspecified trimester ([facility])     Encounter for prenatal care of first pregnancy    Encounter for surveillance of contraceptive pills 05/11/2018    Encounter for surveillance of contraceptive pills    Irregular menstruation, unspecified 05/11/2018    Missed period    Irregular menstruation, unspecified 07/02/2020    Missed menses    Laceration without foreign body of vagina and vulva, initial encounter 06/29/2021    Labial tear    Mastodynia 01/15/2020    Breast tenderness in female    Other problems related to lifestyle 07/01/2020    Other problems related to lifestyle    Other specified disorders of nose and nasal sinuses 05/01/2019    Sinus pressure    Other specified health status     No pertinent past surgical history    Pelvic and perineal pain 01/15/2020     Pelvic cramping    Personal history of other complications of pregnancy, childbirth and the puerperium 02/09/2021    History of pre-eclampsia    Personal history of other diseases of the female genital tract 01/15/2020    History of irregular menstrual cycles    Personal history of other diseases of the female genital tract 05/08/2019    History of ovarian cyst    Personal history of other diseases of the female genital tract 07/30/2018    History of acute pelvic inflammatory disease    Personal history of other diseases of the respiratory system 05/19/2016    History of sinusitis    Personal history of other diseases of the respiratory system 05/19/2016    History of allergic rhinitis    Personal history of other specified conditions 07/30/2018    History of abdominal pain    Unspecified asthma, uncomplicated ([facility]) 11/22/2017    Asthma   [2]   Past Surgical History:  Procedure Laterality Date    OTHER SURGICAL HISTORY  03/03/2021    No history of surgery   [3] No family history on file.  [4]   Social History  Tobacco Use    Smoking status: Not on file    Smokeless tobacco: Not on file   Substance Use Topics    Alcohol use: Not on file    Drug use: Not on file        Jani Cao PA-C  06/12/25 5743

## 2025-06-16 LAB
ATRIAL RATE: 88 BPM
P AXIS: 54 DEGREES
P OFFSET: 191 MS
P ONSET: 145 MS
PR INTERVAL: 154 MS
Q ONSET: 222 MS
QRS COUNT: 15 BEATS
QRS DURATION: 74 MS
QT INTERVAL: 360 MS
QTC CALCULATION(BAZETT): 435 MS
QTC FREDERICIA: 409 MS
R AXIS: 50 DEGREES
T AXIS: 14 DEGREES
T OFFSET: 402 MS
VENTRICULAR RATE: 88 BPM

## 2025-07-09 ENCOUNTER — HOSPITAL ENCOUNTER (EMERGENCY)
Facility: HOSPITAL | Age: 25
Discharge: HOME | End: 2025-07-09

## 2025-07-09 ENCOUNTER — APPOINTMENT (OUTPATIENT)
Dept: CARDIOLOGY | Facility: HOSPITAL | Age: 25
End: 2025-07-09

## 2025-07-09 ENCOUNTER — APPOINTMENT (OUTPATIENT)
Dept: RADIOLOGY | Facility: HOSPITAL | Age: 25
End: 2025-07-09

## 2025-07-09 VITALS
SYSTOLIC BLOOD PRESSURE: 134 MMHG | OXYGEN SATURATION: 99 % | WEIGHT: 207 LBS | TEMPERATURE: 98.2 F | HEIGHT: 64 IN | DIASTOLIC BLOOD PRESSURE: 81 MMHG | BODY MASS INDEX: 35.34 KG/M2 | RESPIRATION RATE: 20 BRPM | HEART RATE: 79 BPM

## 2025-07-09 DIAGNOSIS — R05.9 COUGH, UNSPECIFIED TYPE: Primary | ICD-10-CM

## 2025-07-09 DIAGNOSIS — J45.901 EXACERBATION OF ASTHMA, UNSPECIFIED ASTHMA SEVERITY, UNSPECIFIED WHETHER PERSISTENT (HHS-HCC): ICD-10-CM

## 2025-07-09 DIAGNOSIS — R07.89 ATYPICAL CHEST PAIN: ICD-10-CM

## 2025-07-09 LAB
ANION GAP SERPL CALC-SCNC: 9 MMOL/L (ref 10–20)
BASOPHILS # BLD AUTO: 0.01 X10*3/UL (ref 0–0.1)
BASOPHILS NFR BLD AUTO: 0.2 %
BUN SERPL-MCNC: 7 MG/DL (ref 6–23)
CALCIUM SERPL-MCNC: 9.1 MG/DL (ref 8.6–10.3)
CARDIAC TROPONIN I PNL SERPL HS: <3 NG/L (ref 0–13)
CHLORIDE SERPL-SCNC: 106 MMOL/L (ref 98–107)
CO2 SERPL-SCNC: 24 MMOL/L (ref 21–32)
CREAT SERPL-MCNC: 0.67 MG/DL (ref 0.5–1.05)
D DIMER PPP FEU-MCNC: 440 NG/ML FEU
EGFRCR SERPLBLD CKD-EPI 2021: >90 ML/MIN/1.73M*2
EOSINOPHIL # BLD AUTO: 0.1 X10*3/UL (ref 0–0.7)
EOSINOPHIL NFR BLD AUTO: 2 %
ERYTHROCYTE [DISTWIDTH] IN BLOOD BY AUTOMATED COUNT: 12.9 % (ref 11.5–14.5)
GLUCOSE SERPL-MCNC: 106 MG/DL (ref 74–99)
HCT VFR BLD AUTO: 40.2 % (ref 36–46)
HGB BLD-MCNC: 12.7 G/DL (ref 12–16)
IMM GRANULOCYTES # BLD AUTO: 0.01 X10*3/UL (ref 0–0.7)
IMM GRANULOCYTES NFR BLD AUTO: 0.2 % (ref 0–0.9)
LYMPHOCYTES # BLD AUTO: 1.26 X10*3/UL (ref 1.2–4.8)
LYMPHOCYTES NFR BLD AUTO: 25.8 %
MAGNESIUM SERPL-MCNC: 1.63 MG/DL (ref 1.6–2.4)
MCH RBC QN AUTO: 27.5 PG (ref 26–34)
MCHC RBC AUTO-ENTMCNC: 31.6 G/DL (ref 32–36)
MCV RBC AUTO: 87 FL (ref 80–100)
MONOCYTES # BLD AUTO: 0.33 X10*3/UL (ref 0.1–1)
MONOCYTES NFR BLD AUTO: 6.8 %
NEUTROPHILS # BLD AUTO: 3.17 X10*3/UL (ref 1.2–7.7)
NEUTROPHILS NFR BLD AUTO: 65 %
NRBC BLD-RTO: 0 /100 WBCS (ref 0–0)
PLATELET # BLD AUTO: 230 X10*3/UL (ref 150–450)
POTASSIUM SERPL-SCNC: 3.5 MMOL/L (ref 3.5–5.3)
RBC # BLD AUTO: 4.61 X10*6/UL (ref 4–5.2)
S PYO DNA THROAT QL NAA+PROBE: NOT DETECTED
SODIUM SERPL-SCNC: 135 MMOL/L (ref 136–145)
WBC # BLD AUTO: 4.9 X10*3/UL (ref 4.4–11.3)

## 2025-07-09 PROCEDURE — 83735 ASSAY OF MAGNESIUM: CPT | Performed by: NURSE PRACTITIONER

## 2025-07-09 PROCEDURE — 36415 COLL VENOUS BLD VENIPUNCTURE: CPT | Performed by: NURSE PRACTITIONER

## 2025-07-09 PROCEDURE — 80048 BASIC METABOLIC PNL TOTAL CA: CPT | Performed by: NURSE PRACTITIONER

## 2025-07-09 PROCEDURE — 2500000002 HC RX 250 W HCPCS SELF ADMINISTERED DRUGS (ALT 637 FOR MEDICARE OP, ALT 636 FOR OP/ED): Performed by: NURSE PRACTITIONER

## 2025-07-09 PROCEDURE — 96360 HYDRATION IV INFUSION INIT: CPT

## 2025-07-09 PROCEDURE — 99285 EMERGENCY DEPT VISIT HI MDM: CPT | Mod: 25

## 2025-07-09 PROCEDURE — 94640 AIRWAY INHALATION TREATMENT: CPT

## 2025-07-09 PROCEDURE — 71046 X-RAY EXAM CHEST 2 VIEWS: CPT | Performed by: INTERNAL MEDICINE

## 2025-07-09 PROCEDURE — 87651 STREP A DNA AMP PROBE: CPT | Performed by: NURSE PRACTITIONER

## 2025-07-09 PROCEDURE — 85379 FIBRIN DEGRADATION QUANT: CPT | Performed by: NURSE PRACTITIONER

## 2025-07-09 PROCEDURE — 85025 COMPLETE CBC W/AUTO DIFF WBC: CPT | Performed by: NURSE PRACTITIONER

## 2025-07-09 PROCEDURE — 71046 X-RAY EXAM CHEST 2 VIEWS: CPT

## 2025-07-09 PROCEDURE — 2500000004 HC RX 250 GENERAL PHARMACY W/ HCPCS (ALT 636 FOR OP/ED): Performed by: NURSE PRACTITIONER

## 2025-07-09 PROCEDURE — 93005 ELECTROCARDIOGRAM TRACING: CPT

## 2025-07-09 PROCEDURE — 84484 ASSAY OF TROPONIN QUANT: CPT | Performed by: NURSE PRACTITIONER

## 2025-07-09 RX ORDER — PREDNISONE 20 MG/1
40 TABLET ORAL DAILY
Qty: 8 TABLET | Refills: 0 | Status: SHIPPED | OUTPATIENT
Start: 2025-07-10 | End: 2025-07-14

## 2025-07-09 RX ORDER — BENZONATATE 100 MG/1
100 CAPSULE ORAL EVERY 8 HOURS
Qty: 15 CAPSULE | Refills: 0 | Status: SHIPPED | OUTPATIENT
Start: 2025-07-09 | End: 2025-07-14

## 2025-07-09 RX ORDER — IPRATROPIUM BROMIDE AND ALBUTEROL SULFATE 2.5; .5 MG/3ML; MG/3ML
3 SOLUTION RESPIRATORY (INHALATION) ONCE
Status: COMPLETED | OUTPATIENT
Start: 2025-07-09 | End: 2025-07-09

## 2025-07-09 RX ORDER — PREDNISONE 20 MG/1
40 TABLET ORAL ONCE
Status: COMPLETED | OUTPATIENT
Start: 2025-07-09 | End: 2025-07-09

## 2025-07-09 RX ADMIN — PREDNISONE 40 MG: 20 TABLET ORAL at 10:29

## 2025-07-09 RX ADMIN — SODIUM CHLORIDE 1000 ML: 0.9 INJECTION, SOLUTION INTRAVENOUS at 11:33

## 2025-07-09 RX ADMIN — IPRATROPIUM BROMIDE AND ALBUTEROL SULFATE 3 ML: .5; 3 SOLUTION RESPIRATORY (INHALATION) at 09:42

## 2025-07-09 ASSESSMENT — HEART SCORE
RISK FACTORS: NO KNOWN RISK FACTORS
ECG: NORMAL
HEART SCORE: 0
AGE: <45
TROPONIN: LESS THAN OR EQUAL TO NORMAL LIMIT
HISTORY: SLIGHTLY SUSPICIOUS

## 2025-07-09 ASSESSMENT — PAIN SCALES - GENERAL: PAINLEVEL_OUTOF10: 0 - NO PAIN

## 2025-07-09 ASSESSMENT — PAIN - FUNCTIONAL ASSESSMENT: PAIN_FUNCTIONAL_ASSESSMENT: 0-10

## 2025-07-09 NOTE — ED TRIAGE NOTES
Pt complains of cough, productive . Feels tight in chest when she taking deep breath. Sore throat.   Has  had these symptoms for several weeks.

## 2025-07-09 NOTE — ED PROVIDER NOTES
Emergency Department Provider Note        History of Present Illness     History provided by: Patient  Limitations to History: None    HPI:  Shruti Barrientos is a 25 y.o. female with past medical history asthma presents emergency department today due to a 3-week history of cough.  She was seen here for this course and sent home after workup for chest pain.  She was recommended to take OTC medications and to follow-up with PCP.  Patient states initially OTC medications were seeming to help but over the past week her symptoms have worsened.  Denies any hemoptysis, lower extremity swelling, history of DVT or PE, does not take birth control.  Has had no long distance travel.    Physical Exam   Triage vitals:  T 36.8 °C (98.2 °F)  HR (!) 110  /90  RR 20  O2 99 % None (Room air)    Physical Exam  Vitals and nursing note reviewed.   Constitutional:       General: She is not in acute distress.     Appearance: She is well-developed.   HENT:      Head: Normocephalic and atraumatic.      Jaw: No trismus.      Right Ear: Tympanic membrane and ear canal normal.      Left Ear: Tympanic membrane and ear canal normal.      Nose: Congestion and rhinorrhea present.      Right Sinus: No maxillary sinus tenderness or frontal sinus tenderness.      Left Sinus: No maxillary sinus tenderness or frontal sinus tenderness.      Mouth/Throat:      Lips: Pink.      Mouth: Mucous membranes are moist. No injury.      Palate: No mass and lesions.      Pharynx: Oropharynx is clear. Uvula midline. Posterior oropharyngeal erythema and postnasal drip present. No oropharyngeal exudate.      Tonsils: No tonsillar exudate or tonsillar abscesses.   Eyes:      Conjunctiva/sclera: Conjunctivae normal.   Cardiovascular:      Rate and Rhythm: Regular rhythm. Tachycardia present.      Heart sounds: No murmur heard.  Pulmonary:      Effort: Pulmonary effort is normal. No respiratory distress.      Breath sounds: Normal air entry. Examination of the  right-lower field reveals decreased breath sounds. Examination of the left-lower field reveals decreased breath sounds. Decreased breath sounds present. No wheezing, rhonchi or rales.   Abdominal:      Palpations: Abdomen is soft.      Tenderness: There is no abdominal tenderness.   Musculoskeletal:         General: No swelling.      Cervical back: Normal range of motion and neck supple. No pain with movement.   Skin:     General: Skin is warm and dry.      Capillary Refill: Capillary refill takes less than 2 seconds.   Neurological:      Mental Status: She is alert.   Psychiatric:         Mood and Affect: Mood normal.          Medical Decision Making & ED Course   Medical Decision Makin y.o. female with past medical history asthma presents emergency department today due to a 3-week history of cough.  She was seen here for this course and sent home after workup for chest pain.  She was recommended to take OTC medications and to follow-up with PCP.  Patient states initially OTC medications were seeming to help but over the past week her symptoms have worsened.  Denies any hemoptysis, lower extremity swelling, history of DVT or PE, does not take birth control.  Has had no long distance travel.  Patient's heart rate was elevated, after IV fluids this did improve.  Patient cannot be ruled out via PERC criteria, I did obtain a D-dimer which was not elevated, she is low risk for PE.  EKG showed a sinus arrhythmia but no concerning ectopy or concerning ST elevation/depression.  Troponin was not elevated, unlikely ACS.  Strep testing was negative.  Magnesium was normal.  CBC shows no leukocytosis, anemia, and normal platelets.  BMP shows no significant electrolyte abnormality or kidney injury.  Patient's chest x-ray revealed no acute cardiopulmonary process.  Patient's symptoms did mildly improve after DuoNeb and prednisone in the emergency department.  She will be prescribed steroids for home as well as cough  medicine.  Advised her to close follow-up with her PCP.  Given she is complaining of chest pain, I do have low concern as her heart score is 0 I do not believe this is ACS at this time.  She was advised to follow-up with cardiology outpatient.  Also advised to follow-up with her PCP.  Patient is amenable to this plan.  ----    Differential diagnoses considered include but are not limited to: ACS, NSTEMI, arrhythmia, STEMI, heart failure, electrolyte derangement, pneumonia, PE, anemia, viral illness, asthma exacerbation     Social Determinants of Health which Significantly Impact Care: None identified     EKG Independent Interpretation: EKG interpreted by myself. Please see ED Course for full interpretation.    Independent Result Review and Interpretation: Relevant laboratory and radiographic results were reviewed and independently interpreted by myself.  As necessary, they are commented on in the ED Course.    Chronic conditions affecting the patient's care: As documented above in [facility]    The patient was discussed with the following consultants/services: None    Care Considerations: As documented above in [facility]    ED Course:  ED Course as of 07/09/25 1214   Wed Jul 09, 2025   0944 ECG 12 lead  EKG, my read, 0909  Normal sinus rhythm with sinus arrhythmia, no acute ST elevation or depression.  Ventricular rate-87 BPM [WS]   0957 CBC and Auto Differential(!)  CBC stable, no leukocytosis, anemia, normal platelets [WS]   0957 XR chest 2 views  No evidence of acute cardiopulmonary process. [WS]   1026 Magnesium  Normal [WS]   1026 Troponin I, High Sensitivity  Not elevated, unlikely ACS [WS]   1026 Basic metabolic panel(!)  No significant electrolyte abnormalities or kidney injury [WS]   1034 Group A Streptococcus, PCR  Negative [WS]   1156 D-Dimer, VTE Exclusion  Not elevated, low risk for PE [WS]      ED Course User Index  [WS] Yamil Irizarry, APRN-CNP         Diagnoses as of 07/09/25 1214   Cough, unspecified type    Exacerbation of asthma, unspecified asthma severity, unspecified whether persistent ([facility])   Atypical chest pain     Disposition   As a result of the work-up, the patient was discharged home.  she was informed of her diagnosis and instructed to come back with any concerns or worsening of condition.  she and was agreeable to the plan as discussed above.  she was given the opportunity to ask questions.  All of the patient's questions were answered.    Procedures   Procedures    Patient was seen independently    PUNEET Santos  Emergency Medicine     PUNEET Santos  07/09/25 2645

## 2025-07-10 LAB
ATRIAL RATE: 87 BPM
P AXIS: 66 DEGREES
P OFFSET: 194 MS
P ONSET: 146 MS
PR INTERVAL: 152 MS
Q ONSET: 222 MS
QRS COUNT: 14 BEATS
QRS DURATION: 74 MS
QT INTERVAL: 360 MS
QTC CALCULATION(BAZETT): 433 MS
QTC FREDERICIA: 407 MS
R AXIS: 56 DEGREES
T AXIS: 18 DEGREES
T OFFSET: 402 MS
VENTRICULAR RATE: 87 BPM

## 2025-08-25 ENCOUNTER — HOSPITAL ENCOUNTER (EMERGENCY)
Facility: HOSPITAL | Age: 25
Discharge: STILL ADMITTED OR ADMINISTRATIVE | End: 2025-08-25

## 2025-08-25 PROCEDURE — 99282 EMERGENCY DEPT VISIT SF MDM: CPT

## 2025-08-25 PROCEDURE — 4500999001 HC ED NO CHARGE
